# Patient Record
Sex: FEMALE | Race: WHITE | NOT HISPANIC OR LATINO | Employment: FULL TIME | URBAN - NONMETROPOLITAN AREA
[De-identification: names, ages, dates, MRNs, and addresses within clinical notes are randomized per-mention and may not be internally consistent; named-entity substitution may affect disease eponyms.]

---

## 2018-08-15 ENCOUNTER — TRANSCRIBE ORDERS (OUTPATIENT)
Dept: ADMINISTRATIVE | Facility: HOSPITAL | Age: 28
End: 2018-08-15

## 2018-08-15 DIAGNOSIS — M25.552 LEFT HIP PAIN: Primary | ICD-10-CM

## 2018-08-17 ENCOUNTER — HOSPITAL ENCOUNTER (OUTPATIENT)
Dept: MRI IMAGING | Facility: HOSPITAL | Age: 28
Discharge: HOME | End: 2018-08-17
Attending: FAMILY MEDICINE | Admitting: FAMILY MEDICINE

## 2018-08-17 DIAGNOSIS — M25.552 LEFT HIP PAIN: ICD-10-CM

## 2018-08-17 PROCEDURE — 73721 MRI JNT OF LWR EXTRE W/O DYE: CPT

## 2018-08-17 PROCEDURE — 73721 MRI JNT OF LWR EXTRE W/O DYE: CPT | Performed by: RADIOLOGY

## 2020-08-10 ENCOUNTER — TRANSCRIBE ORDERS (OUTPATIENT)
Dept: ADMINISTRATIVE | Facility: HOSPITAL | Age: 30
End: 2020-08-10

## 2020-08-10 DIAGNOSIS — E01.0 THYROMEGALY: Primary | ICD-10-CM

## 2020-08-13 ENCOUNTER — HOSPITAL ENCOUNTER (OUTPATIENT)
Dept: ULTRASOUND IMAGING | Facility: HOSPITAL | Age: 30
Discharge: HOME | End: 2020-08-13
Admitting: NURSE PRACTITIONER

## 2020-08-13 DIAGNOSIS — E01.0 THYROMEGALY: ICD-10-CM

## 2020-08-13 PROCEDURE — 76536 US EXAM OF HEAD AND NECK: CPT | Performed by: RADIOLOGY

## 2020-08-13 PROCEDURE — 76536 US EXAM OF HEAD AND NECK: CPT

## 2020-08-20 ENCOUNTER — TRANSCRIBE ORDERS (OUTPATIENT)
Dept: ADMINISTRATIVE | Facility: HOSPITAL | Age: 30
End: 2020-08-20

## 2020-08-20 DIAGNOSIS — E01.0 THYROMEGALY: Primary | ICD-10-CM

## 2020-08-25 ENCOUNTER — APPOINTMENT (OUTPATIENT)
Dept: ULTRASOUND IMAGING | Facility: HOSPITAL | Age: 30
End: 2020-08-25

## 2021-02-02 ENCOUNTER — APPOINTMENT (OUTPATIENT)
Dept: ULTRASOUND IMAGING | Facility: HOSPITAL | Age: 31
End: 2021-02-02

## 2021-02-02 ENCOUNTER — HOSPITAL ENCOUNTER (OUTPATIENT)
Dept: ULTRASOUND IMAGING | Facility: HOSPITAL | Age: 31
Discharge: HOME | End: 2021-02-02
Admitting: NURSE PRACTITIONER

## 2021-02-02 DIAGNOSIS — E01.0 THYROMEGALY: ICD-10-CM

## 2021-02-02 PROCEDURE — 76536 US EXAM OF HEAD AND NECK: CPT | Performed by: RADIOLOGY

## 2021-02-02 PROCEDURE — 76536 US EXAM OF HEAD AND NECK: CPT

## 2021-03-16 ENCOUNTER — LAB (OUTPATIENT)
Dept: LAB | Facility: HOSPITAL | Age: 31
End: 2021-03-16

## 2021-03-16 ENCOUNTER — TRANSCRIBE ORDERS (OUTPATIENT)
Dept: ADMINISTRATIVE | Facility: HOSPITAL | Age: 31
End: 2021-03-16

## 2021-03-16 DIAGNOSIS — E01.0 THYROMEGALY: Primary | ICD-10-CM

## 2021-03-16 DIAGNOSIS — E01.0 THYROMEGALY: ICD-10-CM

## 2021-03-16 LAB
T4 FREE SERPL-MCNC: 1.19 NG/DL (ref 0.93–1.7)
TSH SERPL DL<=0.05 MIU/L-ACNC: 1.08 UIU/ML (ref 0.27–4.2)

## 2021-03-16 PROCEDURE — 84439 ASSAY OF FREE THYROXINE: CPT

## 2021-03-16 PROCEDURE — 36415 COLL VENOUS BLD VENIPUNCTURE: CPT

## 2021-03-16 PROCEDURE — 84443 ASSAY THYROID STIM HORMONE: CPT

## 2022-09-02 ENCOUNTER — LAB (OUTPATIENT)
Dept: LAB | Facility: HOSPITAL | Age: 32
End: 2022-09-02

## 2022-09-02 ENCOUNTER — TRANSCRIBE ORDERS (OUTPATIENT)
Dept: ADMINISTRATIVE | Facility: HOSPITAL | Age: 32
End: 2022-09-02

## 2022-09-02 DIAGNOSIS — R53.83 LETHARGY: Primary | ICD-10-CM

## 2022-09-02 DIAGNOSIS — R53.83 LETHARGY: ICD-10-CM

## 2022-09-02 LAB
25(OH)D3 SERPL-MCNC: 30.5 NG/ML (ref 30–100)
ALBUMIN SERPL-MCNC: 4.5 G/DL (ref 3.5–5.2)
ALBUMIN/GLOB SERPL: 2.6 G/DL
ALP SERPL-CCNC: 43 U/L (ref 39–117)
ALT SERPL W P-5'-P-CCNC: 11 U/L (ref 1–33)
ANION GAP SERPL CALCULATED.3IONS-SCNC: 8 MMOL/L (ref 5–15)
AST SERPL-CCNC: 17 U/L (ref 1–32)
BASOPHILS # BLD AUTO: 0.06 10*3/MM3 (ref 0–0.2)
BASOPHILS NFR BLD AUTO: 1 % (ref 0–1.5)
BILIRUB SERPL-MCNC: 0.3 MG/DL (ref 0–1.2)
BUN SERPL-MCNC: 8 MG/DL (ref 6–20)
BUN/CREAT SERPL: 12.5 (ref 7–25)
CALCIUM SPEC-SCNC: 8.6 MG/DL (ref 8.6–10.5)
CHLORIDE SERPL-SCNC: 105 MMOL/L (ref 98–107)
CO2 SERPL-SCNC: 24 MMOL/L (ref 22–29)
CREAT SERPL-MCNC: 0.64 MG/DL (ref 0.57–1)
DEPRECATED RDW RBC AUTO: 39.5 FL (ref 37–54)
EGFRCR SERPLBLD CKD-EPI 2021: 119.1 ML/MIN/1.73
EOSINOPHIL # BLD AUTO: 0.15 10*3/MM3 (ref 0–0.4)
EOSINOPHIL NFR BLD AUTO: 2.6 % (ref 0.3–6.2)
ERYTHROCYTE [DISTWIDTH] IN BLOOD BY AUTOMATED COUNT: 12.4 % (ref 12.3–15.4)
FOLATE SERPL-MCNC: 17.8 NG/ML (ref 4.78–24.2)
GLOBULIN UR ELPH-MCNC: 1.7 GM/DL
GLUCOSE SERPL-MCNC: 83 MG/DL (ref 65–99)
HBA1C MFR BLD: 4.9 % (ref 4.8–5.6)
HCT VFR BLD AUTO: 38.6 % (ref 34–46.6)
HGB BLD-MCNC: 13.2 G/DL (ref 12–15.9)
IMM GRANULOCYTES # BLD AUTO: 0.05 10*3/MM3 (ref 0–0.05)
IMM GRANULOCYTES NFR BLD AUTO: 0.9 % (ref 0–0.5)
LYMPHOCYTES # BLD AUTO: 1.75 10*3/MM3 (ref 0.7–3.1)
LYMPHOCYTES NFR BLD AUTO: 30 % (ref 19.6–45.3)
MCH RBC QN AUTO: 30.5 PG (ref 26.6–33)
MCHC RBC AUTO-ENTMCNC: 34.2 G/DL (ref 31.5–35.7)
MCV RBC AUTO: 89.1 FL (ref 79–97)
MONOCYTES # BLD AUTO: 0.38 10*3/MM3 (ref 0.1–0.9)
MONOCYTES NFR BLD AUTO: 6.5 % (ref 5–12)
NEUTROPHILS NFR BLD AUTO: 3.44 10*3/MM3 (ref 1.7–7)
NEUTROPHILS NFR BLD AUTO: 59 % (ref 42.7–76)
NRBC BLD AUTO-RTO: 0 /100 WBC (ref 0–0.2)
PLATELET # BLD AUTO: 291 10*3/MM3 (ref 140–450)
PMV BLD AUTO: 9.7 FL (ref 6–12)
POTASSIUM SERPL-SCNC: 4.1 MMOL/L (ref 3.5–5.2)
PROT SERPL-MCNC: 6.2 G/DL (ref 6–8.5)
RBC # BLD AUTO: 4.33 10*6/MM3 (ref 3.77–5.28)
SODIUM SERPL-SCNC: 137 MMOL/L (ref 136–145)
T-UPTAKE NFR SERPL: 1.05 TBI (ref 0.8–1.3)
T4 SERPL-MCNC: 7.2 MCG/DL (ref 4.5–11.7)
TSH SERPL DL<=0.05 MIU/L-ACNC: 0.85 UIU/ML (ref 0.27–4.2)
VIT B12 BLD-MCNC: 263 PG/ML (ref 211–946)
WBC NRBC COR # BLD: 5.83 10*3/MM3 (ref 3.4–10.8)

## 2022-09-02 PROCEDURE — 36415 COLL VENOUS BLD VENIPUNCTURE: CPT

## 2022-09-02 PROCEDURE — 83036 HEMOGLOBIN GLYCOSYLATED A1C: CPT

## 2022-09-02 PROCEDURE — 82746 ASSAY OF FOLIC ACID SERUM: CPT

## 2022-09-02 PROCEDURE — 82607 VITAMIN B-12: CPT

## 2022-09-02 PROCEDURE — 80050 GENERAL HEALTH PANEL: CPT

## 2022-09-02 PROCEDURE — 82306 VITAMIN D 25 HYDROXY: CPT

## 2022-09-02 PROCEDURE — 84436 ASSAY OF TOTAL THYROXINE: CPT

## 2022-09-02 PROCEDURE — 84479 ASSAY OF THYROID (T3 OR T4): CPT

## 2022-11-11 ENCOUNTER — TRANSCRIBE ORDERS (OUTPATIENT)
Dept: ADMINISTRATIVE | Facility: HOSPITAL | Age: 32
End: 2022-11-11

## 2022-11-11 DIAGNOSIS — E04.1 NONTOXIC UNINODULAR GOITER: Primary | ICD-10-CM

## 2022-11-15 ENCOUNTER — HOSPITAL ENCOUNTER (OUTPATIENT)
Dept: ULTRASOUND IMAGING | Facility: HOSPITAL | Age: 32
Discharge: HOME | End: 2022-11-15
Admitting: NURSE PRACTITIONER

## 2022-11-15 DIAGNOSIS — E04.1 NONTOXIC UNINODULAR GOITER: ICD-10-CM

## 2022-11-15 PROCEDURE — 76536 US EXAM OF HEAD AND NECK: CPT

## 2022-11-15 PROCEDURE — 76536 US EXAM OF HEAD AND NECK: CPT | Performed by: RADIOLOGY

## 2023-02-03 ENCOUNTER — TRANSCRIBE ORDERS (OUTPATIENT)
Dept: ADMINISTRATIVE | Facility: HOSPITAL | Age: 33
End: 2023-02-03
Payer: COMMERCIAL

## 2023-02-03 DIAGNOSIS — E28.2 POLYCYSTIC OVARIAN DISEASE: ICD-10-CM

## 2023-02-03 DIAGNOSIS — E04.2 NONTOXIC MULTINODULAR GOITER: ICD-10-CM

## 2023-02-03 DIAGNOSIS — J38.2 VOCAL CORD NODULE: Primary | ICD-10-CM

## 2023-02-24 ENCOUNTER — HOSPITAL ENCOUNTER (OUTPATIENT)
Dept: CT IMAGING | Facility: HOSPITAL | Age: 33
Discharge: HOME | End: 2023-02-24
Admitting: NURSE PRACTITIONER
Payer: COMMERCIAL

## 2023-02-24 DIAGNOSIS — E28.2 POLYCYSTIC OVARIAN DISEASE: ICD-10-CM

## 2023-02-24 DIAGNOSIS — E04.2 NONTOXIC MULTINODULAR GOITER: ICD-10-CM

## 2023-02-24 DIAGNOSIS — J38.2 VOCAL CORD NODULE: ICD-10-CM

## 2023-02-24 PROCEDURE — 74176 CT ABD & PELVIS W/O CONTRAST: CPT | Performed by: RADIOLOGY

## 2023-02-24 PROCEDURE — 74176 CT ABD & PELVIS W/O CONTRAST: CPT

## 2023-02-25 LAB
BASOPHILS # BLD AUTO: 0.04 10*3/MM3 (ref 0–0.2)
BASOPHILS NFR BLD AUTO: 0.5 % (ref 0–1.5)
DEPRECATED RDW RBC AUTO: 39.5 FL (ref 37–54)
EOSINOPHIL # BLD AUTO: 0.19 10*3/MM3 (ref 0–0.4)
EOSINOPHIL NFR BLD AUTO: 2.4 % (ref 0.3–6.2)
ERYTHROCYTE [DISTWIDTH] IN BLOOD BY AUTOMATED COUNT: 12.2 % (ref 12.3–15.4)
HCG SERPL QL: NEGATIVE
HCT VFR BLD AUTO: 41.5 % (ref 34–46.6)
HGB BLD-MCNC: 14.2 G/DL (ref 12–15.9)
IMM GRANULOCYTES # BLD AUTO: 0.02 10*3/MM3 (ref 0–0.05)
IMM GRANULOCYTES NFR BLD AUTO: 0.3 % (ref 0–0.5)
LYMPHOCYTES # BLD AUTO: 2.56 10*3/MM3 (ref 0.7–3.1)
LYMPHOCYTES NFR BLD AUTO: 32.2 % (ref 19.6–45.3)
MCH RBC QN AUTO: 30.7 PG (ref 26.6–33)
MCHC RBC AUTO-ENTMCNC: 34.2 G/DL (ref 31.5–35.7)
MCV RBC AUTO: 89.8 FL (ref 79–97)
MONOCYTES # BLD AUTO: 0.53 10*3/MM3 (ref 0.1–0.9)
MONOCYTES NFR BLD AUTO: 6.7 % (ref 5–12)
NEUTROPHILS NFR BLD AUTO: 4.61 10*3/MM3 (ref 1.7–7)
NEUTROPHILS NFR BLD AUTO: 57.9 % (ref 42.7–76)
NRBC BLD AUTO-RTO: 0 /100 WBC (ref 0–0.2)
PLATELET # BLD AUTO: 288 10*3/MM3 (ref 140–450)
PMV BLD AUTO: 9.1 FL (ref 6–12)
RBC # BLD AUTO: 4.62 10*6/MM3 (ref 3.77–5.28)
WBC NRBC COR # BLD: 7.95 10*3/MM3 (ref 3.4–10.8)

## 2023-02-25 PROCEDURE — 81001 URINALYSIS AUTO W/SCOPE: CPT | Performed by: NURSE PRACTITIONER

## 2023-02-25 PROCEDURE — 36415 COLL VENOUS BLD VENIPUNCTURE: CPT

## 2023-02-25 PROCEDURE — 80053 COMPREHEN METABOLIC PANEL: CPT | Performed by: NURSE PRACTITIONER

## 2023-02-25 PROCEDURE — 84703 CHORIONIC GONADOTROPIN ASSAY: CPT | Performed by: NURSE PRACTITIONER

## 2023-02-25 PROCEDURE — 85025 COMPLETE CBC W/AUTO DIFF WBC: CPT | Performed by: NURSE PRACTITIONER

## 2023-02-25 PROCEDURE — 99283 EMERGENCY DEPT VISIT LOW MDM: CPT

## 2023-02-26 ENCOUNTER — HOSPITAL ENCOUNTER (EMERGENCY)
Facility: HOSPITAL | Age: 33
Discharge: HOME | End: 2023-02-26
Attending: EMERGENCY MEDICINE
Payer: COMMERCIAL

## 2023-02-26 ENCOUNTER — APPOINTMENT (OUTPATIENT)
Dept: CT IMAGING | Facility: HOSPITAL | Age: 33
End: 2023-02-26
Payer: COMMERCIAL

## 2023-02-26 VITALS
BODY MASS INDEX: 27.16 KG/M2 | DIASTOLIC BLOOD PRESSURE: 84 MMHG | HEART RATE: 74 BPM | OXYGEN SATURATION: 98 % | TEMPERATURE: 97.4 F | RESPIRATION RATE: 18 BRPM | WEIGHT: 169 LBS | SYSTOLIC BLOOD PRESSURE: 115 MMHG | HEIGHT: 66 IN

## 2023-02-26 DIAGNOSIS — N39.0 ACUTE UTI: ICD-10-CM

## 2023-02-26 DIAGNOSIS — R51.9 ACUTE NONINTRACTABLE HEADACHE, UNSPECIFIED HEADACHE TYPE: Primary | ICD-10-CM

## 2023-02-26 LAB
ALBUMIN SERPL-MCNC: 4.7 G/DL (ref 3.5–5.2)
ALBUMIN/GLOB SERPL: 2 G/DL
ALP SERPL-CCNC: 46 U/L (ref 39–117)
ALT SERPL W P-5'-P-CCNC: 11 U/L (ref 1–33)
ANION GAP SERPL CALCULATED.3IONS-SCNC: 11.1 MMOL/L (ref 5–15)
AST SERPL-CCNC: 14 U/L (ref 1–32)
BACTERIA UR QL AUTO: ABNORMAL /HPF
BILIRUB SERPL-MCNC: 0.5 MG/DL (ref 0–1.2)
BILIRUB UR QL STRIP: NEGATIVE
BUN SERPL-MCNC: 12 MG/DL (ref 6–20)
BUN/CREAT SERPL: 15.8 (ref 7–25)
CALCIUM SPEC-SCNC: 9.5 MG/DL (ref 8.6–10.5)
CHLORIDE SERPL-SCNC: 105 MMOL/L (ref 98–107)
CLARITY UR: CLEAR
CO2 SERPL-SCNC: 25.9 MMOL/L (ref 22–29)
COLOR UR: YELLOW
CREAT SERPL-MCNC: 0.76 MG/DL (ref 0.57–1)
EGFRCR SERPLBLD CKD-EPI 2021: 104.9 ML/MIN/1.73
GLOBULIN UR ELPH-MCNC: 2.4 GM/DL
GLUCOSE SERPL-MCNC: 116 MG/DL (ref 65–99)
GLUCOSE UR STRIP-MCNC: NEGATIVE MG/DL
HGB UR QL STRIP.AUTO: NEGATIVE
HOLD SPECIMEN: NORMAL
HYALINE CASTS UR QL AUTO: ABNORMAL /LPF
KETONES UR QL STRIP: NEGATIVE
LEUKOCYTE ESTERASE UR QL STRIP.AUTO: ABNORMAL
MUCOUS THREADS URNS QL MICRO: ABNORMAL /HPF
NITRITE UR QL STRIP: NEGATIVE
PH UR STRIP.AUTO: 6.5 [PH] (ref 5–8)
POTASSIUM SERPL-SCNC: 4.6 MMOL/L (ref 3.5–5.2)
PROT SERPL-MCNC: 7.1 G/DL (ref 6–8.5)
PROT UR QL STRIP: NEGATIVE
RBC # UR STRIP: ABNORMAL /HPF
REF LAB TEST METHOD: ABNORMAL
SODIUM SERPL-SCNC: 142 MMOL/L (ref 136–145)
SP GR UR STRIP: 1.02 (ref 1–1.03)
SQUAMOUS #/AREA URNS HPF: ABNORMAL /HPF
UROBILINOGEN UR QL STRIP: ABNORMAL
WBC # UR STRIP: ABNORMAL /HPF
WHOLE BLOOD HOLD COAG: NORMAL
WHOLE BLOOD HOLD SPECIMEN: NORMAL

## 2023-02-26 PROCEDURE — 25010000002 PROCHLORPERAZINE 10 MG/2ML SOLUTION: Performed by: NURSE PRACTITIONER

## 2023-02-26 PROCEDURE — 96374 THER/PROPH/DIAG INJ IV PUSH: CPT

## 2023-02-26 PROCEDURE — 70450 CT HEAD/BRAIN W/O DYE: CPT

## 2023-02-26 PROCEDURE — 25010000002 KETOROLAC TROMETHAMINE PER 15 MG: Performed by: NURSE PRACTITIONER

## 2023-02-26 PROCEDURE — 96375 TX/PRO/DX INJ NEW DRUG ADDON: CPT

## 2023-02-26 RX ORDER — KETOROLAC TROMETHAMINE 30 MG/ML
30 INJECTION, SOLUTION INTRAMUSCULAR; INTRAVENOUS ONCE
Status: COMPLETED | OUTPATIENT
Start: 2023-02-26 | End: 2023-02-26

## 2023-02-26 RX ORDER — PROCHLORPERAZINE EDISYLATE 5 MG/ML
5 INJECTION INTRAMUSCULAR; INTRAVENOUS ONCE
Status: COMPLETED | OUTPATIENT
Start: 2023-02-26 | End: 2023-02-26

## 2023-02-26 RX ORDER — NITROFURANTOIN 25; 75 MG/1; MG/1
100 CAPSULE ORAL 2 TIMES DAILY
Qty: 14 CAPSULE | Refills: 0 | Status: SHIPPED | OUTPATIENT
Start: 2023-02-26 | End: 2023-03-05

## 2023-02-26 RX ORDER — SODIUM CHLORIDE 0.9 % (FLUSH) 0.9 %
10 SYRINGE (ML) INJECTION AS NEEDED
Status: DISCONTINUED | OUTPATIENT
Start: 2023-02-26 | End: 2023-02-26 | Stop reason: HOSPADM

## 2023-02-26 RX ADMIN — KETOROLAC TROMETHAMINE 30 MG: 30 INJECTION, SOLUTION INTRAMUSCULAR; INTRAVENOUS at 01:23

## 2023-02-26 RX ADMIN — PROCHLORPERAZINE EDISYLATE 5 MG: 5 INJECTION INTRAMUSCULAR; INTRAVENOUS at 01:23

## 2023-02-26 RX ADMIN — SODIUM CHLORIDE 1000 ML: 9 INJECTION, SOLUTION INTRAVENOUS at 01:23

## 2023-04-27 ENCOUNTER — TRANSCRIBE ORDERS (OUTPATIENT)
Dept: ADMINISTRATIVE | Facility: HOSPITAL | Age: 33
End: 2023-04-27
Payer: COMMERCIAL

## 2023-04-27 ENCOUNTER — LAB (OUTPATIENT)
Dept: LAB | Facility: HOSPITAL | Age: 33
End: 2023-04-27
Payer: COMMERCIAL
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
## 2023-04-27 PROCEDURE — 36415 COLL VENOUS BLD VENIPUNCTURE: CPT

## 2023-04-27 PROCEDURE — 86008 ALLG SPEC IGE RECOMB EA: CPT

## 2023-05-01 LAB — ALPHA-GAL IGE QN: <0.1 KU/L

## 2023-08-22 ENCOUNTER — TRANSCRIBE ORDERS (OUTPATIENT)
Dept: ADMINISTRATIVE | Facility: HOSPITAL | Age: 33
End: 2023-08-22
Payer: COMMERCIAL

## 2023-08-22 ENCOUNTER — LAB (OUTPATIENT)
Dept: LAB | Facility: HOSPITAL | Age: 33
End: 2023-08-22
Payer: COMMERCIAL

## 2023-08-22 DIAGNOSIS — R22.1 NECK MASS: Primary | ICD-10-CM

## 2023-08-22 DIAGNOSIS — T78.1XXA GASTROINTESTINAL FOOD SENSITIVITY: ICD-10-CM

## 2023-08-22 DIAGNOSIS — R22.1 NECK MASS: ICD-10-CM

## 2023-08-22 DIAGNOSIS — J31.0 CHRONIC RHINITIS: ICD-10-CM

## 2023-08-22 PROCEDURE — 86003 ALLG SPEC IGE CRUDE XTRC EA: CPT

## 2023-08-22 PROCEDURE — 82785 ASSAY OF IGE: CPT

## 2023-08-22 PROCEDURE — 36415 COLL VENOUS BLD VENIPUNCTURE: CPT

## 2023-08-25 LAB
A ALTERNATA IGE QN: <0.1 KU/L
A FUMIGATUS IGE QN: <0.1 KU/L
BEEF IGE QN: <0.1 KU/L
BERMUDA GRASS IGE QN: <0.1 KU/L
BOXELDER IGE QN: <0.1 KU/L
C HERBARUM IGE QN: <0.1 KU/L
CALIF WALNUT POLN IGE QN: <0.1 KU/L
CAT DANDER IGE QN: <0.1 KU/L
CMN PIGWEED IGE QN: <0.1 KU/L
COMMON RAGWEED IGE QN: <0.1 KU/L
CONV CLASS DESCRIPTION: NORMAL
COTTONWOOD IGE QN: <0.1 KU/L
COW MILK IGE QN: <0.1 KU/L
D FARINAE IGE QN: <0.1 KU/L
D PTERONYSS IGE QN: <0.1 KU/L
DOG DANDER IGE QN: <0.1 KU/L
IGE SERPL-ACNC: 7 IU/ML (ref 6–495)
LONDON PLANE IGE QN: <0.1 KU/L
MOUSE URINE PROT IGE QN: <0.1 KU/L
MT JUNIPER IGE QN: <0.1 KU/L
P NOTATUM IGE QN: <0.1 KU/L
PECAN/HICK TREE IGE QN: <0.1 KU/L
PORK IGE QN: <0.1 KU/L
ROACH IGE QN: <0.1 KU/L
SALTWORT IGE QN: <0.1 KU/L
SHEEP SORREL IGE QN: <0.1 KU/L
SILVER BIRCH IGE QN: <0.1 KU/L
TIMOTHY IGE QN: <0.1 KU/L
WHITE ASH IGE QN: <0.1 KU/L
WHITE ELM IGE QN: <0.1 KU/L
WHITE MULBERRY IGE QN: <0.1 KU/L
WHITE OAK IGE QN: <0.1 KU/L

## 2023-08-26 LAB — SHRIMP IGE QN: <0.1 KU/L

## 2024-03-08 ENCOUNTER — TRANSCRIBE ORDERS (OUTPATIENT)
Dept: ADMINISTRATIVE | Facility: HOSPITAL | Age: 34
End: 2024-03-08
Payer: COMMERCIAL

## 2024-03-08 DIAGNOSIS — E04.2 NONTOXIC MULTINODULAR GOITER: Primary | ICD-10-CM

## 2024-03-20 ENCOUNTER — HOSPITAL ENCOUNTER (OUTPATIENT)
Facility: HOSPITAL | Age: 34
Discharge: HOME | End: 2024-03-20
Admitting: NURSE PRACTITIONER
Payer: COMMERCIAL

## 2024-03-20 DIAGNOSIS — E04.2 NONTOXIC MULTINODULAR GOITER: ICD-10-CM

## 2024-03-20 PROCEDURE — 76536 US EXAM OF HEAD AND NECK: CPT

## 2024-03-20 PROCEDURE — 76536 US EXAM OF HEAD AND NECK: CPT | Performed by: RADIOLOGY

## 2024-05-08 ENCOUNTER — HOSPITAL ENCOUNTER (OUTPATIENT)
Dept: MAMMOGRAPHY | Facility: HOSPITAL | Age: 34
Discharge: HOME | End: 2024-05-08
Payer: COMMERCIAL

## 2024-05-08 ENCOUNTER — HOSPITAL ENCOUNTER (OUTPATIENT)
Dept: ULTRASOUND IMAGING | Facility: HOSPITAL | Age: 34
Discharge: HOME | End: 2024-05-08
Payer: COMMERCIAL

## 2024-05-08 DIAGNOSIS — N63.0 MASS OF BREAST, UNSPECIFIED LATERALITY: ICD-10-CM

## 2024-05-08 PROCEDURE — G0279 TOMOSYNTHESIS, MAMMO: HCPCS

## 2024-05-08 PROCEDURE — 77066 DX MAMMO INCL CAD BI: CPT

## 2024-05-08 PROCEDURE — 76642 ULTRASOUND BREAST LIMITED: CPT

## 2024-08-02 ENCOUNTER — OFFICE VISIT (OUTPATIENT)
Dept: ENDOCRINOLOGY | Facility: CLINIC | Age: 34
End: 2024-08-02
Payer: COMMERCIAL

## 2024-08-02 VITALS
BODY MASS INDEX: 25.86 KG/M2 | SYSTOLIC BLOOD PRESSURE: 116 MMHG | WEIGHT: 160.2 LBS | HEART RATE: 87 BPM | DIASTOLIC BLOOD PRESSURE: 73 MMHG | OXYGEN SATURATION: 96 %

## 2024-08-02 DIAGNOSIS — E04.1 SOLITARY THYROID NODULE: Primary | ICD-10-CM

## 2024-08-02 PROCEDURE — 86800 THYROGLOBULIN ANTIBODY: CPT | Performed by: NURSE PRACTITIONER

## 2024-08-02 PROCEDURE — 84443 ASSAY THYROID STIM HORMONE: CPT | Performed by: NURSE PRACTITIONER

## 2024-08-02 PROCEDURE — 84481 FREE ASSAY (FT-3): CPT | Performed by: NURSE PRACTITIONER

## 2024-08-02 PROCEDURE — 86376 MICROSOMAL ANTIBODY EACH: CPT | Performed by: NURSE PRACTITIONER

## 2024-08-02 PROCEDURE — 84439 ASSAY OF FREE THYROXINE: CPT | Performed by: NURSE PRACTITIONER

## 2024-08-02 RX ORDER — ERGOCALCIFEROL (VITAMIN D2) 10 MCG
400 TABLET ORAL DAILY
COMMUNITY

## 2024-08-02 RX ORDER — NICOTINE 14MG/24HR
PATCH, TRANSDERMAL 24 HOURS TRANSDERMAL
COMMUNITY

## 2024-08-02 NOTE — ASSESSMENT & PLAN NOTE
Discussed ultrasound findings with patient.  Discussed that with her increased inflammation in size of the thyroid, it is possible that she may have autoimmune thyroiditis.  Will obtain labs today to determine thyroid function with thyroid antibodies to determine if medication is required.  Patient is clinically euthyroid in office today.  Discussed that with the decrease size of nodules repeat ultrasound will be completed in 2 years.  Will follow-up in 1 year.

## 2024-08-02 NOTE — PROGRESS NOTES
No chief complaint on file.       Referring Provider  Teri Turner A* HPI   Precious Roberts is a 34 y.o. female had no chief complaint listed for this encounter.   Seen as a new patient.  Thyroid Nodule.    In 2011/2012 she had a cyst on her thyroid which was noted to be drained.  She was noted to have 2 nodules on the thyroid and they have grown and sent her here for further eval.  Ultrasound thyroid: 03/2024  FINDINGS:  LEFT THYROID LOBE:  Slightly heterogeneous thyroid gland with left lobe of thyroid nodule measuring 6 mm.  RIGHT THYROID LOBE:  Unremarkable as visualized.  No enlarged or calcified nodules.  ISTHMUS:  Unremarkable as visualized.  No enlarged or calcified nodules.  LYMPH NODES:  Unremarkable as visualized.  No lymphadenopathy.  IMPRESSION: Slightly heterogeneous thyroid gland with left lobe of thyroid nodule  measuring 6 mm.    Birth state: [place]  Previous history of radiation to face/neck: none  Consuming foods high in iodine: none  Family history of thyroid complications: none    The following portions of the patient's history were reviewed and updated as appropriate: allergies, current medications, past family history, past medical history, past social history, past surgical history, and problem list.  Past Medical History:   Diagnosis Date    PCOS (polycystic ovarian syndrome)      History reviewed. No pertinent surgical history.   Family History   Problem Relation Age of Onset    Breast cancer Maternal Grandmother       Social History     Socioeconomic History    Marital status:    Tobacco Use    Smoking status: Never   Substance and Sexual Activity    Drug use: Never    Sexual activity: Defer      Allergies   Allergen Reactions    Latex Irritability      Current Outpatient Medications on File Prior to Visit   Medication Sig Dispense Refill    Omega-3 Fatty Acids (OMEGA 3 500 PO) Take  by mouth.      Saccharomyces boulardii (Probiotic) 250 MG capsule Take  by mouth.       Vitamin D, Cholecalciferol, (CHOLECALCIFEROL) 10 MCG (400 UNIT) tablet Take 1 tablet by mouth Daily.       No current facility-administered medications on file prior to visit.      Review of Systems   Constitutional:  Positive for fatigue.        Difficulty losing weight.   HENT:  Negative for trouble swallowing.    Eyes: Negative.    Endocrine: Negative.    All other systems reviewed and are negative.    /73 (BP Location: Right arm, Patient Position: Sitting, Cuff Size: Small Adult)   Pulse 87   Wt 72.7 kg (160 lb 3.2 oz)   SpO2 96%   BMI 25.86 kg/m²      Physical Exam  Vitals reviewed.   Constitutional:       Appearance: Normal appearance.   Eyes:      Extraocular Movements: Extraocular movements intact.   Neck:      Thyroid: Thyromegaly and thyroid tenderness present. No thyroid mass.   Cardiovascular:      Rate and Rhythm: Normal rate.   Pulmonary:      Effort: Pulmonary effort is normal.   Neurological:      General: No focal deficit present.      Mental Status: She is alert and oriented to person, place, and time.   Psychiatric:         Mood and Affect: Mood normal.         Behavior: Behavior normal.         Thought Content: Thought content normal.         Judgment: Judgment normal.       Labs/Imaging  CMP  Lab Results   Component Value Date    GLUCOSE 116 (H) 02/25/2023    BUN 12 02/25/2023    CREATININE 0.76 02/25/2023    BCR 15.8 02/25/2023    K 4.6 02/25/2023    CO2 25.9 02/25/2023    CALCIUM 9.5 02/25/2023    ALBUMIN 4.7 02/25/2023    AST 14 02/25/2023    ALT 11 02/25/2023        CBC w/DIFF   Lab Results   Component Value Date    WBC 7.95 02/25/2023    RBC 4.62 02/25/2023    HGB 14.2 02/25/2023    HCT 41.5 02/25/2023    MCV 89.8 02/25/2023    MCH 30.7 02/25/2023    MCHC 34.2 02/25/2023    RDW 12.2 (L) 02/25/2023    RDWSD 39.5 02/25/2023    MPV 9.1 02/25/2023     02/25/2023    NEUTRORELPCT 57.9 02/25/2023    LYMPHORELPCT 32.2 02/25/2023    MONORELPCT 6.7 02/25/2023    EOSRELPCT 2.4  "02/25/2023    BASORELPCT 0.5 02/25/2023    AUTOIGPER 0.3 02/25/2023    NEUTROABS 4.61 02/25/2023    LYMPHSABS 2.56 02/25/2023    MONOSABS 0.53 02/25/2023    EOSABS 0.19 02/25/2023    BASOSABS 0.04 02/25/2023    AUTOIGNUM 0.02 02/25/2023    NRBC 0.0 02/25/2023       TSH  Lab Results   Component Value Date    TSH 0.847 09/02/2022    TSH 1.080 03/16/2021       T4  Lab Results   Component Value Date    FREET4 1.19 03/16/2021     Lab Results   Component Value Date    G9LAPYE 7.20 09/02/2022       T3  No results found for: \"T3FREE\"  No results found for: \"L9SWSXO\"    TRAb  No results found for: \"TSURCPAB\"    TPO  No results found for: \"THYROIDAB\"    No valid procedures specified.    Assessment and Plan    Diagnoses and all orders for this visit:    1. Solitary thyroid nodule (Primary)  Assessment & Plan:  Discussed ultrasound findings with patient.  Discussed that with her increased inflammation in size of the thyroid, it is possible that she may have autoimmune thyroiditis.  Will obtain labs today to determine thyroid function with thyroid antibodies to determine if medication is required.  Patient is clinically euthyroid in office today.  Discussed that with the decrease size of nodules repeat ultrasound will be completed in 2 years.  Will follow-up in 1 year.    Orders:  -     Thyroid Antibodies  -     TSH  -     T4, free  -     T3, Free         Return in about 6 months (around 2/2/2025) for Follow-up appointment. The patient was instructed to contact the clinic with any interval questions or concerns.      This document has been electronically signed by CLAUDY Arizmendi  August 2, 2024 11:31 EDT   Endocrinology    Please note that portions of this document were completed with a voice recognition program. Efforts were made to edit the dictations, but occasionally words are mis-transcribed.   "

## 2024-08-03 LAB
T3FREE SERPL-MCNC: 2.81 PG/ML (ref 2–4.4)
T4 FREE SERPL-MCNC: 1.25 NG/DL (ref 0.92–1.68)
TSH SERPL DL<=0.05 MIU/L-ACNC: 0.89 UIU/ML (ref 0.27–4.2)

## 2024-08-05 ENCOUNTER — TRANSCRIBE ORDERS (OUTPATIENT)
Dept: ADMINISTRATIVE | Facility: HOSPITAL | Age: 34
End: 2024-08-05
Payer: COMMERCIAL

## 2024-08-05 DIAGNOSIS — Z12.31 VISIT FOR SCREENING MAMMOGRAM: Primary | ICD-10-CM

## 2024-08-05 LAB
THYROGLOB AB SERPL-ACNC: <1 IU/ML (ref 0–0.9)
THYROPEROXIDASE AB SERPL-ACNC: <9 IU/ML (ref 0–34)

## 2024-09-26 ENCOUNTER — LAB (OUTPATIENT)
Dept: LAB | Facility: HOSPITAL | Age: 34
End: 2024-09-26
Payer: COMMERCIAL

## 2024-09-26 ENCOUNTER — OFFICE VISIT (OUTPATIENT)
Dept: FAMILY MEDICINE CLINIC | Facility: CLINIC | Age: 34
End: 2024-09-26
Payer: COMMERCIAL

## 2024-09-26 ENCOUNTER — LAB (OUTPATIENT)
Dept: FAMILY MEDICINE CLINIC | Facility: CLINIC | Age: 34
End: 2024-09-26
Payer: COMMERCIAL

## 2024-09-26 ENCOUNTER — TRANSCRIBE ORDERS (OUTPATIENT)
Dept: ADMINISTRATIVE | Facility: HOSPITAL | Age: 34
End: 2024-09-26
Payer: COMMERCIAL

## 2024-09-26 VITALS
TEMPERATURE: 97.9 F | WEIGHT: 165.2 LBS | DIASTOLIC BLOOD PRESSURE: 78 MMHG | BODY MASS INDEX: 27.52 KG/M2 | SYSTOLIC BLOOD PRESSURE: 112 MMHG | HEIGHT: 65 IN | OXYGEN SATURATION: 98 % | HEART RATE: 86 BPM

## 2024-09-26 DIAGNOSIS — N92.6 IRREGULAR MENSTRUAL CYCLE: ICD-10-CM

## 2024-09-26 DIAGNOSIS — E04.1 SOLITARY THYROID NODULE: ICD-10-CM

## 2024-09-26 DIAGNOSIS — R79.0 LOW MAGNESIUM LEVEL: ICD-10-CM

## 2024-09-26 DIAGNOSIS — N92.6 IRREGULAR PERIODS: ICD-10-CM

## 2024-09-26 DIAGNOSIS — Z13.6 SCREENING FOR CARDIOVASCULAR CONDITION: ICD-10-CM

## 2024-09-26 DIAGNOSIS — E55.9 VITAMIN D DEFICIENCY: Primary | ICD-10-CM

## 2024-09-26 DIAGNOSIS — E28.2 PCOS (POLYCYSTIC OVARIAN SYNDROME): ICD-10-CM

## 2024-09-26 DIAGNOSIS — N92.6 IRREGULAR MENSTRUAL CYCLE: Primary | ICD-10-CM

## 2024-09-26 DIAGNOSIS — E55.9 VITAMIN D DEFICIENCY: ICD-10-CM

## 2024-09-26 PROCEDURE — 84439 ASSAY OF FREE THYROXINE: CPT

## 2024-09-26 PROCEDURE — 82627 DEHYDROEPIANDROSTERONE: CPT

## 2024-09-26 PROCEDURE — 82306 VITAMIN D 25 HYDROXY: CPT

## 2024-09-26 PROCEDURE — 99214 OFFICE O/P EST MOD 30 MIN: CPT | Performed by: NURSE PRACTITIONER

## 2024-09-26 PROCEDURE — 84146 ASSAY OF PROLACTIN: CPT

## 2024-09-26 PROCEDURE — 82670 ASSAY OF TOTAL ESTRADIOL: CPT

## 2024-09-26 PROCEDURE — 83001 ASSAY OF GONADOTROPIN (FSH): CPT

## 2024-09-26 PROCEDURE — 36415 COLL VENOUS BLD VENIPUNCTURE: CPT

## 2024-09-26 PROCEDURE — 80061 LIPID PANEL: CPT

## 2024-09-26 PROCEDURE — 84402 ASSAY OF FREE TESTOSTERONE: CPT

## 2024-09-26 PROCEDURE — 83735 ASSAY OF MAGNESIUM: CPT

## 2024-09-26 PROCEDURE — 83498 ASY HYDROXYPROGESTERONE 17-D: CPT

## 2024-09-26 PROCEDURE — 80053 COMPREHEN METABOLIC PANEL: CPT

## 2024-09-26 PROCEDURE — 81001 URINALYSIS AUTO W/SCOPE: CPT

## 2024-09-26 PROCEDURE — 83002 ASSAY OF GONADOTROPIN (LH): CPT

## 2024-09-26 PROCEDURE — 84703 CHORIONIC GONADOTROPIN ASSAY: CPT

## 2024-09-26 PROCEDURE — 85025 COMPLETE CBC W/AUTO DIFF WBC: CPT

## 2024-09-26 PROCEDURE — 84443 ASSAY THYROID STIM HORMONE: CPT

## 2024-09-26 PROCEDURE — 84403 ASSAY OF TOTAL TESTOSTERONE: CPT

## 2024-09-26 PROCEDURE — 84144 ASSAY OF PROGESTERONE: CPT

## 2024-09-26 RX ORDER — PRENATAL VIT NO.126/IRON/FOLIC 28MG-0.8MG
1 TABLET ORAL DAILY
COMMUNITY

## 2024-09-27 ENCOUNTER — TELEPHONE (OUTPATIENT)
Dept: FAMILY MEDICINE CLINIC | Facility: CLINIC | Age: 34
End: 2024-09-27
Payer: COMMERCIAL

## 2024-09-27 ENCOUNTER — PATIENT ROUNDING (BHMG ONLY) (OUTPATIENT)
Dept: FAMILY MEDICINE CLINIC | Facility: CLINIC | Age: 34
End: 2024-09-27
Payer: COMMERCIAL

## 2024-09-27 LAB
25(OH)D3 SERPL-MCNC: 36.7 NG/ML (ref 30–100)
ALBUMIN SERPL-MCNC: 4.7 G/DL (ref 3.5–5.2)
ALBUMIN/GLOB SERPL: 1.9 G/DL
ALP SERPL-CCNC: 43 U/L (ref 39–117)
ALT SERPL W P-5'-P-CCNC: 26 U/L (ref 1–33)
AMORPH URATE CRY URNS QL MICRO: ABNORMAL /HPF
ANION GAP SERPL CALCULATED.3IONS-SCNC: 9.6 MMOL/L (ref 5–15)
AST SERPL-CCNC: 18 U/L (ref 1–32)
BACTERIA UR QL AUTO: ABNORMAL /HPF
BASOPHILS # BLD AUTO: 0.06 10*3/MM3 (ref 0–0.2)
BASOPHILS NFR BLD AUTO: 0.7 % (ref 0–1.5)
BILIRUB SERPL-MCNC: 0.3 MG/DL (ref 0–1.2)
BILIRUB UR QL STRIP: NEGATIVE
BUN SERPL-MCNC: 14 MG/DL (ref 6–20)
BUN/CREAT SERPL: 17.3 (ref 7–25)
CALCIUM SPEC-SCNC: 9.6 MG/DL (ref 8.6–10.5)
CHLORIDE SERPL-SCNC: 103 MMOL/L (ref 98–107)
CHOLEST SERPL-MCNC: 122 MG/DL (ref 0–200)
CLARITY UR: ABNORMAL
CO2 SERPL-SCNC: 24.4 MMOL/L (ref 22–29)
COLOR UR: YELLOW
CREAT SERPL-MCNC: 0.81 MG/DL (ref 0.57–1)
DEPRECATED RDW RBC AUTO: 41.7 FL (ref 37–54)
EGFRCR SERPLBLD CKD-EPI 2021: 96.6 ML/MIN/1.73
EOSINOPHIL # BLD AUTO: 0.13 10*3/MM3 (ref 0–0.4)
EOSINOPHIL NFR BLD AUTO: 1.6 % (ref 0.3–6.2)
ERYTHROCYTE [DISTWIDTH] IN BLOOD BY AUTOMATED COUNT: 12.2 % (ref 12.3–15.4)
ESTRADIOL SERPL HS-MCNC: 121 PG/ML
FSH SERPL-ACNC: 3.24 MIU/ML
GLOBULIN UR ELPH-MCNC: 2.5 GM/DL
GLUCOSE SERPL-MCNC: 76 MG/DL (ref 65–99)
GLUCOSE UR STRIP-MCNC: NEGATIVE MG/DL
HCG SERPL QL: NEGATIVE
HCT VFR BLD AUTO: 42 % (ref 34–46.6)
HDLC SERPL-MCNC: 51 MG/DL (ref 40–60)
HGB BLD-MCNC: 14.1 G/DL (ref 12–15.9)
HGB UR QL STRIP.AUTO: NEGATIVE
HOLD SPECIMEN: NORMAL
HYALINE CASTS UR QL AUTO: ABNORMAL /LPF
IMM GRANULOCYTES # BLD AUTO: 0.04 10*3/MM3 (ref 0–0.05)
IMM GRANULOCYTES NFR BLD AUTO: 0.5 % (ref 0–0.5)
KETONES UR QL STRIP: NEGATIVE
LDLC SERPL CALC-MCNC: 45 MG/DL (ref 0–100)
LDLC/HDLC SERPL: 0.79 {RATIO}
LEUKOCYTE ESTERASE UR QL STRIP.AUTO: ABNORMAL
LH SERPL-ACNC: 7.18 MIU/ML
LYMPHOCYTES # BLD AUTO: 2.34 10*3/MM3 (ref 0.7–3.1)
LYMPHOCYTES NFR BLD AUTO: 28.8 % (ref 19.6–45.3)
MAGNESIUM SERPL-MCNC: 2.3 MG/DL (ref 1.6–2.6)
MCH RBC QN AUTO: 31.3 PG (ref 26.6–33)
MCHC RBC AUTO-ENTMCNC: 33.6 G/DL (ref 31.5–35.7)
MCV RBC AUTO: 93.3 FL (ref 79–97)
MONOCYTES # BLD AUTO: 0.58 10*3/MM3 (ref 0.1–0.9)
MONOCYTES NFR BLD AUTO: 7.1 % (ref 5–12)
NEUTROPHILS NFR BLD AUTO: 4.97 10*3/MM3 (ref 1.7–7)
NEUTROPHILS NFR BLD AUTO: 61.3 % (ref 42.7–76)
NITRITE UR QL STRIP: NEGATIVE
NRBC BLD AUTO-RTO: 0 /100 WBC (ref 0–0.2)
PH UR STRIP.AUTO: 7.5 [PH] (ref 5–8)
PLATELET # BLD AUTO: 308 10*3/MM3 (ref 140–450)
PMV BLD AUTO: 9.8 FL (ref 6–12)
POTASSIUM SERPL-SCNC: 3.6 MMOL/L (ref 3.5–5.2)
PROGEST SERPL-MCNC: 6.5 NG/ML
PROLACTIN SERPL-MCNC: 19.8 NG/ML (ref 4.79–23.3)
PROT SERPL-MCNC: 7.2 G/DL (ref 6–8.5)
PROT UR QL STRIP: NEGATIVE
RBC # BLD AUTO: 4.5 10*6/MM3 (ref 3.77–5.28)
RBC # UR STRIP: ABNORMAL /HPF
REF LAB TEST METHOD: ABNORMAL
SODIUM SERPL-SCNC: 137 MMOL/L (ref 136–145)
SP GR UR STRIP: 1.02 (ref 1–1.03)
SQUAMOUS #/AREA URNS HPF: ABNORMAL /HPF
T4 FREE SERPL-MCNC: 1.31 NG/DL (ref 0.92–1.68)
TRIGL SERPL-MCNC: 154 MG/DL (ref 0–150)
TSH SERPL DL<=0.05 MIU/L-ACNC: 1.38 UIU/ML (ref 0.27–4.2)
UROBILINOGEN UR QL STRIP: ABNORMAL
VLDLC SERPL-MCNC: 26 MG/DL (ref 5–40)
WBC # UR STRIP: ABNORMAL /HPF
WBC NRBC COR # BLD AUTO: 8.12 10*3/MM3 (ref 3.4–10.8)

## 2024-09-27 NOTE — PROGRESS NOTES
Please let know her labs looked good, no concerns. Urine was not significant enough to do a culture, most likely contaminants/skin landry. Thank you

## 2024-09-28 LAB — DHEA-S SERPL-MCNC: 97.3 UG/DL (ref 57.3–279.2)

## 2024-09-29 PROBLEM — N92.6 IRREGULAR PERIODS: Status: ACTIVE | Noted: 2024-09-29

## 2024-09-29 PROBLEM — E55.9 VITAMIN D DEFICIENCY: Status: ACTIVE | Noted: 2024-09-29

## 2024-10-03 LAB
TESTOST FREE SERPL-MCNC: 0.2 PG/ML (ref 0–4.2)
TESTOST SERPL-MCNC: 5 NG/DL (ref 8–60)

## 2024-10-04 LAB — 17OHP SERPL-MCNC: 106 NG/DL

## 2024-11-07 ENCOUNTER — OFFICE VISIT (OUTPATIENT)
Dept: FAMILY MEDICINE CLINIC | Facility: CLINIC | Age: 34
End: 2024-11-07
Payer: COMMERCIAL

## 2024-11-07 VITALS
OXYGEN SATURATION: 99 % | HEART RATE: 100 BPM | BODY MASS INDEX: 28.49 KG/M2 | WEIGHT: 171 LBS | TEMPERATURE: 98.9 F | HEIGHT: 65 IN | SYSTOLIC BLOOD PRESSURE: 110 MMHG | DIASTOLIC BLOOD PRESSURE: 70 MMHG

## 2024-11-07 DIAGNOSIS — M79.671 RIGHT FOOT PAIN: Primary | ICD-10-CM

## 2024-11-07 PROCEDURE — 99213 OFFICE O/P EST LOW 20 MIN: CPT | Performed by: FAMILY MEDICINE

## 2024-11-11 ENCOUNTER — TELEPHONE (OUTPATIENT)
Dept: FAMILY MEDICINE CLINIC | Facility: CLINIC | Age: 34
End: 2024-11-11
Payer: COMMERCIAL

## 2024-11-11 NOTE — TELEPHONE ENCOUNTER
----- Message from Kyra Cleveland sent at 11/11/2024  8:28 AM EST -----  Please let patient know results are normal. Thank you.

## 2024-11-12 ENCOUNTER — HOSPITAL ENCOUNTER (OUTPATIENT)
Dept: MAMMOGRAPHY | Facility: HOSPITAL | Age: 34
Discharge: HOME | End: 2024-11-12
Admitting: RADIOLOGY
Payer: COMMERCIAL

## 2024-11-12 DIAGNOSIS — R92.8 ABNORMAL MAMMOGRAM: ICD-10-CM

## 2024-11-12 PROCEDURE — G0279 TOMOSYNTHESIS, MAMMO: HCPCS

## 2024-11-12 PROCEDURE — 77065 DX MAMMO INCL CAD UNI: CPT

## 2024-11-12 PROCEDURE — 77061 BREAST TOMOSYNTHESIS UNI: CPT | Performed by: RADIOLOGY

## 2024-11-12 PROCEDURE — 77065 DX MAMMO INCL CAD UNI: CPT | Performed by: RADIOLOGY

## 2024-11-12 NOTE — PROGRESS NOTES
"Chief Complaint  Pain (Toe/)    Subjective          Precious Roberts presents to [facility]  Foot Injury   The incident occurred 12 to 24 hours ago. The incident occurred at home. The injury mechanism was a direct blow. The pain is present in the right toes. The quality of the pain is described as aching. The pain is moderate. The pain has been Fluctuating since onset. Associated symptoms include numbness. Pertinent negatives include no muscle weakness or tingling. The symptoms are aggravated by movement and weight bearing. She has tried NSAIDs and rest for the symptoms. The treatment provided moderate relief.       Review of Systems   Neurological:  Positive for numbness. Negative for tingling.         Objective   Vital Signs:   /70 (BP Location: Right arm, Patient Position: Sitting, Cuff Size: Adult)   Pulse 100   Temp 98.9 °F (37.2 °C) (Temporal)   Ht 165.1 cm (65\")   Wt 77.6 kg (171 lb)   SpO2 99%   BMI 28.46 kg/m²     Physical Exam  Constitutional:       General: She is not in acute distress.     Appearance: Normal appearance. She is well-developed and well-groomed. She is not ill-appearing, toxic-appearing or diaphoretic.   HENT:      Head: Normocephalic.      Nose: Nose normal. No congestion or rhinorrhea.      Mouth/Throat:      Mouth: Mucous membranes are moist.      Pharynx: Oropharynx is clear. No oropharyngeal exudate or posterior oropharyngeal erythema.   Eyes:      General: Lids are normal.         Right eye: No discharge.         Left eye: No discharge.      Extraocular Movements: Extraocular movements intact.      Pupils: Pupils are equal, round, and reactive to light.   Neck:      Vascular: No carotid bruit.   Cardiovascular:      Rate and Rhythm: Normal rate and regular rhythm.      Pulses: Normal pulses.      Heart sounds: Normal heart sounds. No murmur heard.     No friction rub. No gallop.   Pulmonary:      Effort: Pulmonary effort is normal. No " respiratory distress.      Breath sounds: Normal breath sounds. No stridor. No wheezing, rhonchi or rales.   Chest:      Chest wall: No tenderness.   Abdominal:      General: Bowel sounds are normal. There is no distension.      Palpations: Abdomen is soft. There is no mass.      Tenderness: There is no abdominal tenderness. There is no right CVA tenderness, left CVA tenderness, guarding or rebound.      Hernia: No hernia is present.   Musculoskeletal:         General: No swelling or tenderness. Normal range of motion.      Cervical back: Normal range of motion and neck supple. No rigidity or tenderness.      Right lower leg: No edema.      Left lower leg: No edema.      Right foot: Swelling present.   Lymphadenopathy:      Cervical: No cervical adenopathy.   Skin:     General: Skin is warm.      Capillary Refill: Capillary refill takes less than 2 seconds.      Coloration: Skin is not jaundiced.      Findings: No bruising, erythema or rash.   Neurological:      General: No focal deficit present.      Mental Status: She is alert and oriented to person, place, and time.      Motor: Motor function is intact. No weakness.      Coordination: Coordination is intact.      Gait: Gait is intact. Gait normal.   Psychiatric:         Attention and Perception: Attention normal.         Mood and Affect: Mood normal.         Speech: Speech normal.         Behavior: Behavior normal.         Cognition and Memory: Cognition normal.         Judgment: Judgment normal.        Result Review :                 Assessment and Plan    Diagnoses and all orders for this visit:    1. Right foot pain (Primary)  -     XR Foot 3+ View Right (In Office)      Patient's Body mass index is 28.46 kg/m². indicating that she is overweight (BMI 25-29.9). Patient's (Body mass index is 28.46 kg/m².) indicates that they are overweight with health conditions that include none . Weight is unchanged. BMI is above average; BMI management plan is completed. We  discussed low calorie, low carb based diet program, portion control, and increasing exercise. .    Follow Up   Return xray today.  Patient was given instructions and counseling regarding her condition or for health maintenance advice. Please see specific information pulled into the AVS if appropriate.     This document has been electronically signed by CLAUDY Ricks  November 12, 2024 11:01 EST

## 2025-01-06 ENCOUNTER — TRANSCRIBE ORDERS (OUTPATIENT)
Dept: ADMINISTRATIVE | Facility: HOSPITAL | Age: 35
End: 2025-01-06
Payer: COMMERCIAL

## 2025-01-06 ENCOUNTER — LAB (OUTPATIENT)
Dept: LAB | Facility: HOSPITAL | Age: 35
End: 2025-01-06
Payer: COMMERCIAL

## 2025-01-06 DIAGNOSIS — N97.9 PRIMARY FEMALE INFERTILITY: Primary | ICD-10-CM

## 2025-01-06 DIAGNOSIS — N97.9 PRIMARY FEMALE INFERTILITY: ICD-10-CM

## 2025-01-06 PROCEDURE — 36415 COLL VENOUS BLD VENIPUNCTURE: CPT

## 2025-01-06 PROCEDURE — 84144 ASSAY OF PROGESTERONE: CPT

## 2025-01-07 LAB — PROGEST SERPL-MCNC: 0.48 NG/ML

## 2025-01-10 ENCOUNTER — OFFICE VISIT (OUTPATIENT)
Dept: ENDOCRINOLOGY | Facility: CLINIC | Age: 35
End: 2025-01-10
Payer: COMMERCIAL

## 2025-01-10 VITALS
HEART RATE: 72 BPM | WEIGHT: 173.2 LBS | SYSTOLIC BLOOD PRESSURE: 122 MMHG | DIASTOLIC BLOOD PRESSURE: 76 MMHG | OXYGEN SATURATION: 98 % | BODY MASS INDEX: 28.82 KG/M2

## 2025-01-10 DIAGNOSIS — E04.1 SOLITARY THYROID NODULE: Primary | ICD-10-CM

## 2025-01-10 LAB
T4 FREE SERPL-MCNC: 1.21 NG/DL (ref 0.92–1.68)
TSH SERPL DL<=0.05 MIU/L-ACNC: 0.87 UIU/ML (ref 0.27–4.2)

## 2025-01-10 PROCEDURE — 84443 ASSAY THYROID STIM HORMONE: CPT | Performed by: NURSE PRACTITIONER

## 2025-01-10 PROCEDURE — 84439 ASSAY OF FREE THYROXINE: CPT | Performed by: NURSE PRACTITIONER

## 2025-01-10 NOTE — PROGRESS NOTES
Chief Complaint   Patient presents with    Follow-up     thyroid        Referring Provider  No ref. provider found     HPI   Precious Roberts is a 34 y.o. female had concerns including Follow-up (thyroid).   Thyroid Nodule.    She reports that she has been doing well.  She denies any changes to her neck or compressive s/sx's.  She is not currently taking any thyroid medication and denies any s/sx's hyper/hypothyroidism.    Thyroid:  In 2011/2012 she had a cyst on her thyroid which was noted to be drained.  She was noted to have 2 nodules on the thyroid and they have grown and sent her here for further eval.  Ultrasound thyroid: 03/2024  FINDINGS:  LEFT THYROID LOBE:  Slightly heterogeneous thyroid gland with left lobe of thyroid nodule measuring 6 mm.  RIGHT THYROID LOBE:  Unremarkable as visualized.  No enlarged or calcified nodules.  ISTHMUS:  Unremarkable as visualized.  No enlarged or calcified nodules.  LYMPH NODES:  Unremarkable as visualized.  No lymphadenopathy.  IMPRESSION: Slightly heterogeneous thyroid gland with left lobe of thyroid nodule  measuring 6 mm.    Birth state: [place]  Previous history of radiation to face/neck: none  Consuming foods high in iodine: none  Family history of thyroid complications: none    The following portions of the patient's history were reviewed and updated as appropriate: allergies, current medications, past family history, past medical history, past social history, past surgical history, and problem list.  Past Medical History:   Diagnosis Date    PCOS (polycystic ovarian syndrome)      Past Surgical History:   Procedure Laterality Date    DILATATION AND CURETTAGE  02/01/2018    LASIK      WISDOM TOOTH EXTRACTION        Family History   Problem Relation Age of Onset    Cancer Maternal Grandmother         breast    Breast cancer Maternal Grandmother     Stroke Maternal Grandfather     Heart disease Maternal Grandfather     Cancer Paternal Grandfather         prostate      Social  History     Socioeconomic History    Marital status:    Tobacco Use    Smoking status: Never    Smokeless tobacco: Never   Vaping Use    Vaping status: Never Used   Substance and Sexual Activity    Alcohol use: Never    Drug use: Never    Sexual activity: Defer      Allergies   Allergen Reactions    Latex Irritability      Current Outpatient Medications on File Prior to Visit   Medication Sig Dispense Refill    B Complex-C (SUPER B COMPLEX PO) Take  by mouth.      Collagen-Vitamin C-Biotin (Collagen) 500-50-0.8 MG capsule Take 1 tablet by mouth Daily.      Magnesium Glycinate 120 MG capsule Take  by mouth.      Menaquinone-7 (VITAMIN K2 PO) Take  by mouth.      Omega-3 Fatty Acids (OMEGA 3 500 PO) Take  by mouth.      prenatal vitamin (prenatal, CLASSIC, vitamin) tablet Take 1 tablet by mouth Daily.      Probiotic Product (PROBIOTIC BLEND PO) Take 1 tablet by mouth Daily.      Vitamin D, Cholecalciferol, (CHOLECALCIFEROL) 10 MCG (400 UNIT) tablet Take 1 tablet by mouth Daily.       No current facility-administered medications on file prior to visit.      Review of Systems   Constitutional:  Positive for unexpected weight gain. Negative for fatigue and unexpected weight loss.        Difficulty losing weight.   HENT:  Negative for trouble swallowing and voice change.    Eyes: Negative.  Negative for blurred vision.   Endocrine: Positive for cold intolerance.   Skin:  Negative for dry skin.   Psychiatric/Behavioral:  Negative for sleep disturbance.    All other systems reviewed and are negative.    /76 (BP Location: Right arm, Patient Position: Sitting, Cuff Size: Adult)   Pulse 72   Wt 78.6 kg (173 lb 3.2 oz)   SpO2 98%   BMI 28.82 kg/m²      Physical Exam  Vitals reviewed.   Constitutional:       Appearance: Normal appearance.   Eyes:      Extraocular Movements: Extraocular movements intact.   Neck:      Thyroid: No thyroid mass, thyromegaly or thyroid tenderness.   Cardiovascular:      Rate and  "Rhythm: Normal rate.   Pulmonary:      Effort: Pulmonary effort is normal.   Neurological:      General: No focal deficit present.      Mental Status: She is alert and oriented to person, place, and time.   Psychiatric:         Mood and Affect: Mood normal.         Behavior: Behavior normal.         Thought Content: Thought content normal.         Judgment: Judgment normal.       Labs/Imaging  CMP  Lab Results   Component Value Date    GLUCOSE 76 09/26/2024    BUN 14 09/26/2024    CREATININE 0.81 09/26/2024    BCR 17.3 09/26/2024    K 3.6 09/26/2024    CO2 24.4 09/26/2024    CALCIUM 9.6 09/26/2024    ALBUMIN 4.7 09/26/2024    AST 18 09/26/2024    ALT 26 09/26/2024        CBC w/DIFF   Lab Results   Component Value Date    WBC 8.12 09/26/2024    RBC 4.50 09/26/2024    HGB 14.1 09/26/2024    HCT 42.0 09/26/2024    MCV 93.3 09/26/2024    MCH 31.3 09/26/2024    MCHC 33.6 09/26/2024    RDW 12.2 (L) 09/26/2024    RDWSD 41.7 09/26/2024    MPV 9.8 09/26/2024     09/26/2024    NEUTRORELPCT 61.3 09/26/2024    LYMPHORELPCT 28.8 09/26/2024    MONORELPCT 7.1 09/26/2024    EOSRELPCT 1.6 09/26/2024    BASORELPCT 0.7 09/26/2024    AUTOIGPER 0.5 09/26/2024    NEUTROABS 4.97 09/26/2024    LYMPHSABS 2.34 09/26/2024    MONOSABS 0.58 09/26/2024    EOSABS 0.13 09/26/2024    BASOSABS 0.06 09/26/2024    AUTOIGNUM 0.04 09/26/2024    NRBC 0.0 09/26/2024       TSH  Lab Results   Component Value Date    TSH 1.380 09/26/2024    TSH 0.885 08/02/2024    TSH 0.847 09/02/2022       T4  Lab Results   Component Value Date    FREET4 1.31 09/26/2024    FREET4 1.25 08/02/2024    FREET4 1.19 03/16/2021     Lab Results   Component Value Date    T7TFDCZ 7.20 09/02/2022       T3  Lab Results   Component Value Date    T3FREE 2.81 08/02/2024     No results found for: \"E4JMIRB\"    TRAb  No results found for: \"TSURCPAB\"    TPO  Lab Results   Component Value Date    THYROIDAB <9 08/02/2024       No valid procedures specified.    Assessment and " Plan    Diagnoses and all orders for this visit:    1. Solitary thyroid nodule (Primary)  Assessment & Plan:  -Clinically euthyroid.  -TFT's today with medication as indicated.  -Her neck exam is negative without associated symptoms.  Will obtain US in 1 year for monitoring unless new symptoms arise.  -Follow-up in 1 year.      Orders:  -     TSH  -     T4, free           Return in about 1 year (around 1/10/2026) for Follow-up appointment. The patient was instructed to contact the clinic with any interval questions or concerns.      This document has been electronically signed by CLAUDY Arizmendi  January 10, 2025 09:01 EST   Endocrinology    Please note that portions of this document were completed with a voice recognition program. Efforts were made to edit the dictations, but occasionally words are mis-transcribed.

## 2025-01-10 NOTE — ASSESSMENT & PLAN NOTE
-Clinically euthyroid.  -TFT's today with medication as indicated.  -Her neck exam is negative without associated symptoms.  Will obtain US in 1 year for monitoring unless new symptoms arise.  -Follow-up in 1 year.

## 2025-02-07 ENCOUNTER — LAB (OUTPATIENT)
Dept: LAB | Facility: HOSPITAL | Age: 35
End: 2025-02-07
Payer: COMMERCIAL

## 2025-02-07 ENCOUNTER — TRANSCRIBE ORDERS (OUTPATIENT)
Dept: ADMINISTRATIVE | Facility: HOSPITAL | Age: 35
End: 2025-02-07
Payer: COMMERCIAL

## 2025-02-07 DIAGNOSIS — N97.9 PRIMARY FEMALE INFERTILITY: ICD-10-CM

## 2025-02-07 DIAGNOSIS — N97.9 PRIMARY FEMALE INFERTILITY: Primary | ICD-10-CM

## 2025-02-07 PROCEDURE — 36415 COLL VENOUS BLD VENIPUNCTURE: CPT

## 2025-02-07 PROCEDURE — 84144 ASSAY OF PROGESTERONE: CPT

## 2025-02-08 LAB — PROGEST SERPL-MCNC: 6.19 NG/ML

## 2025-03-07 ENCOUNTER — LAB (OUTPATIENT)
Dept: LAB | Facility: HOSPITAL | Age: 35
End: 2025-03-07
Payer: COMMERCIAL

## 2025-03-07 ENCOUNTER — TRANSCRIBE ORDERS (OUTPATIENT)
Dept: ADMINISTRATIVE | Facility: HOSPITAL | Age: 35
End: 2025-03-07
Payer: COMMERCIAL

## 2025-03-07 DIAGNOSIS — N97.9 PRIMARY FEMALE INFERTILITY: Primary | ICD-10-CM

## 2025-03-07 DIAGNOSIS — N97.9 PRIMARY FEMALE INFERTILITY: ICD-10-CM

## 2025-03-07 PROCEDURE — 84144 ASSAY OF PROGESTERONE: CPT

## 2025-03-07 PROCEDURE — 36415 COLL VENOUS BLD VENIPUNCTURE: CPT

## 2025-03-08 LAB — PROGEST SERPL-MCNC: 6.4 NG/ML

## 2025-03-12 ENCOUNTER — LAB (OUTPATIENT)
Dept: LAB | Facility: HOSPITAL | Age: 35
End: 2025-03-12
Payer: COMMERCIAL

## 2025-03-12 ENCOUNTER — TRANSCRIBE ORDERS (OUTPATIENT)
Dept: LAB | Facility: HOSPITAL | Age: 35
End: 2025-03-12
Payer: COMMERCIAL

## 2025-03-12 DIAGNOSIS — Z78.9 PERSON LIVING IN RESIDENTIAL INSTITUTION: ICD-10-CM

## 2025-03-12 DIAGNOSIS — Z78.9 PERSON LIVING IN RESIDENTIAL INSTITUTION: Primary | ICD-10-CM

## 2025-03-12 LAB — HCG INTACT+B SERPL-ACNC: <1 MIU/ML

## 2025-03-12 PROCEDURE — 84702 CHORIONIC GONADOTROPIN TEST: CPT

## 2025-03-12 PROCEDURE — 36415 COLL VENOUS BLD VENIPUNCTURE: CPT

## 2025-08-25 ENCOUNTER — OFFICE VISIT (OUTPATIENT)
Dept: FAMILY MEDICINE CLINIC | Facility: CLINIC | Age: 35
End: 2025-08-25
Payer: COMMERCIAL

## 2025-08-25 VITALS
TEMPERATURE: 97.1 F | WEIGHT: 176 LBS | BODY MASS INDEX: 29.32 KG/M2 | HEART RATE: 97 BPM | SYSTOLIC BLOOD PRESSURE: 126 MMHG | DIASTOLIC BLOOD PRESSURE: 76 MMHG | OXYGEN SATURATION: 98 % | HEIGHT: 65 IN

## 2025-08-25 DIAGNOSIS — M79.672 FOOT PAIN, LEFT: Primary | ICD-10-CM

## 2025-08-25 DIAGNOSIS — M79.675 PAIN OF TOE OF LEFT FOOT: ICD-10-CM

## 2025-08-25 PROCEDURE — 99213 OFFICE O/P EST LOW 20 MIN: CPT | Performed by: NURSE PRACTITIONER

## 2025-08-25 RX ORDER — LISDEXAMFETAMINE DIMESYLATE 30 MG/1
1 CAPSULE ORAL DAILY
COMMUNITY
Start: 2025-07-11

## 2025-08-25 RX ORDER — IBUPROFEN 800 MG/1
800 TABLET, FILM COATED ORAL EVERY 8 HOURS PRN
Qty: 30 TABLET | Refills: 0 | Status: SHIPPED | OUTPATIENT
Start: 2025-08-25 | End: 2025-09-04

## 2025-08-26 ENCOUNTER — RESULTS FOLLOW-UP (OUTPATIENT)
Dept: FAMILY MEDICINE CLINIC | Facility: CLINIC | Age: 35
End: 2025-08-26
Payer: COMMERCIAL

## 2025-08-26 DIAGNOSIS — S92.515A CLOSED NONDISPLACED FRACTURE OF PROXIMAL PHALANX OF LESSER TOE OF LEFT FOOT, INITIAL ENCOUNTER: Primary | ICD-10-CM

## 2025-08-27 ENCOUNTER — TELEPHONE (OUTPATIENT)
Dept: FAMILY MEDICINE CLINIC | Facility: CLINIC | Age: 35
End: 2025-08-27
Payer: COMMERCIAL